# Patient Record
Sex: FEMALE | Race: WHITE
[De-identification: names, ages, dates, MRNs, and addresses within clinical notes are randomized per-mention and may not be internally consistent; named-entity substitution may affect disease eponyms.]

---

## 2020-11-30 ENCOUNTER — HOSPITAL ENCOUNTER (EMERGENCY)
Dept: HOSPITAL 35 - ER | Age: 40
Discharge: HOME | End: 2020-11-30
Payer: COMMERCIAL

## 2020-11-30 VITALS — SYSTOLIC BLOOD PRESSURE: 99 MMHG | DIASTOLIC BLOOD PRESSURE: 61 MMHG

## 2020-11-30 VITALS — HEIGHT: 64 IN | BODY MASS INDEX: 27.32 KG/M2 | WEIGHT: 160.01 LBS

## 2020-11-30 DIAGNOSIS — E78.00: ICD-10-CM

## 2020-11-30 DIAGNOSIS — Z90.711: ICD-10-CM

## 2020-11-30 DIAGNOSIS — R07.89: Primary | ICD-10-CM

## 2020-11-30 DIAGNOSIS — Z90.49: ICD-10-CM

## 2020-11-30 DIAGNOSIS — Z88.1: ICD-10-CM

## 2020-11-30 DIAGNOSIS — F17.210: ICD-10-CM

## 2020-11-30 DIAGNOSIS — R20.0: ICD-10-CM

## 2020-11-30 DIAGNOSIS — Z98.890: ICD-10-CM

## 2020-11-30 DIAGNOSIS — I10: ICD-10-CM

## 2020-11-30 LAB
ALBUMIN SERPL-MCNC: 3.8 G/DL (ref 3.4–5)
ALT SERPL-CCNC: 68 U/L (ref 30–65)
ANION GAP SERPL CALC-SCNC: 7 MMOL/L (ref 7–16)
AST SERPL-CCNC: 28 U/L (ref 15–37)
BASOPHILS NFR BLD AUTO: 0.7 % (ref 0–2)
BILIRUB SERPL-MCNC: 0.5 MG/DL (ref 0.2–1)
BUN SERPL-MCNC: 9 MG/DL (ref 7–18)
CALCIUM SERPL-MCNC: 9.5 MG/DL (ref 8.5–10.1)
CHLORIDE SERPL-SCNC: 106 MMOL/L (ref 98–107)
CO2 SERPL-SCNC: 27 MMOL/L (ref 21–32)
CREAT SERPL-MCNC: 1 MG/DL (ref 0.6–1)
EOSINOPHIL NFR BLD: 2.8 % (ref 0–3)
ERYTHROCYTE [DISTWIDTH] IN BLOOD BY AUTOMATED COUNT: 13.4 % (ref 10.5–14.5)
GLUCOSE SERPL-MCNC: 89 MG/DL (ref 74–106)
GRANULOCYTES NFR BLD MANUAL: 69.6 % (ref 36–66)
HCT VFR BLD CALC: 44.9 % (ref 37–47)
HGB BLD-MCNC: 14.9 GM/DL (ref 12–15)
LYMPHOCYTES NFR BLD AUTO: 21.3 % (ref 24–44)
MCH RBC QN AUTO: 30.5 PG (ref 26–34)
MCHC RBC AUTO-ENTMCNC: 33.2 G/DL (ref 28–37)
MCV RBC: 91.9 FL (ref 80–100)
MONOCYTES NFR BLD: 5.6 % (ref 1–8)
NEUTROPHILS # BLD: 8.5 THOU/UL (ref 1.4–8.2)
PLATELET # BLD: 321 THOU/UL (ref 150–400)
POTASSIUM SERPL-SCNC: 4.1 MMOL/L (ref 3.5–5.1)
PROT SERPL-MCNC: 7.5 G/DL (ref 6.4–8.2)
RBC # BLD AUTO: 4.89 MIL/UL (ref 4.2–5)
SODIUM SERPL-SCNC: 140 MMOL/L (ref 136–145)
TROPONIN I SERPL-MCNC: <0.06 NG/ML (ref ?–0.06)
WBC # BLD AUTO: 12.2 THOU/UL (ref 4–11)

## 2020-11-30 NOTE — EKG
[facility]
[address] [place]   [address]                     ELECTROCARDIOGRAM REPORT      
_______________________________________________________________________________
 
Name:       KI CHAN                Room #:                     [facility].#:      [id number]                       Account #:      [id number]  
Admission:  20    Attend Phys:                          
Discharge:              Date of Birth:  [birth year]  
                                                          Report #: [id number]
                                                                    [id number]
_______________________________________________________________________________
THIS REPORT FOR:  
 
cc:  FAM - Family physician unknown
     FAM - Family physician unknown
     Ray Callahan MD [facility]                                         ~
THIS REPORT FOR:   //name//                          
 
                         [facility] ED
                                       
Test Date:    2020               Test Time:    14:12:48
Pat Name:     KI CHAN           Department:   
Patient ID:   [id number]            Room:          
Gender:       F                        Technician:   JACK
:          1980               Requested By: Ruben Minor
Order Number: [id number] MD:   Ray Callahan
                                 Measurements
Intervals                              Axis          
Rate:         69                       P:            28
NJ:           110                      QRS:          69
QRSD:         85                       T:            44
QT:           388                                    
QTc:          416                                    
                           Interpretive Statements
Sinus rhythm
Borderline short [place] interval
Borderline low voltage, extremity leads
No previous ECG available for comparison
Electronically Signed On 2020 16:10:49 CST by Ray Callahan
[email or web address]
 
 
 
 
 
 
 
 
 
 
 
 
 
 
 
  <ELECTRONICALLY SIGNED>
   By: Ray Callahan MD, FAC    
  20     1610
D: 20 1412                           _____________________________________
T: 20                           Ray Callahan MD, FAC      /EPI